# Patient Record
Sex: FEMALE | Race: WHITE | NOT HISPANIC OR LATINO
[De-identification: names, ages, dates, MRNs, and addresses within clinical notes are randomized per-mention and may not be internally consistent; named-entity substitution may affect disease eponyms.]

---

## 2017-03-28 ENCOUNTER — RESULT REVIEW (OUTPATIENT)
Age: 52
End: 2017-03-28

## 2017-04-12 ENCOUNTER — APPOINTMENT (OUTPATIENT)
Dept: ULTRASOUND IMAGING | Facility: CLINIC | Age: 52
End: 2017-04-12

## 2017-04-12 ENCOUNTER — OUTPATIENT (OUTPATIENT)
Dept: OUTPATIENT SERVICES | Facility: HOSPITAL | Age: 52
LOS: 1 days | End: 2017-04-12
Payer: COMMERCIAL

## 2017-04-12 DIAGNOSIS — Z98.89 OTHER SPECIFIED POSTPROCEDURAL STATES: Chronic | ICD-10-CM

## 2017-04-12 DIAGNOSIS — Z00.8 ENCOUNTER FOR OTHER GENERAL EXAMINATION: ICD-10-CM

## 2017-04-12 PROCEDURE — 93880 EXTRACRANIAL BILAT STUDY: CPT

## 2017-07-24 ENCOUNTER — OUTPATIENT (OUTPATIENT)
Dept: OUTPATIENT SERVICES | Facility: HOSPITAL | Age: 52
LOS: 1 days | End: 2017-07-24
Payer: COMMERCIAL

## 2017-07-24 VITALS
OXYGEN SATURATION: 98 % | HEART RATE: 72 BPM | DIASTOLIC BLOOD PRESSURE: 70 MMHG | SYSTOLIC BLOOD PRESSURE: 132 MMHG | HEIGHT: 61 IN | TEMPERATURE: 98 F | RESPIRATION RATE: 18 BRPM | WEIGHT: 125 LBS

## 2017-07-24 DIAGNOSIS — Z98.89 OTHER SPECIFIED POSTPROCEDURAL STATES: Chronic | ICD-10-CM

## 2017-07-24 DIAGNOSIS — Z85.3 PERSONAL HISTORY OF MALIGNANT NEOPLASM OF BREAST: ICD-10-CM

## 2017-07-24 DIAGNOSIS — Z90.13 ACQUIRED ABSENCE OF BILATERAL BREASTS AND NIPPLES: ICD-10-CM

## 2017-07-24 DIAGNOSIS — E78.5 HYPERLIPIDEMIA, UNSPECIFIED: ICD-10-CM

## 2017-07-24 DIAGNOSIS — Z01.818 ENCOUNTER FOR OTHER PREPROCEDURAL EXAMINATION: ICD-10-CM

## 2017-07-24 DIAGNOSIS — Z90.13 ACQUIRED ABSENCE OF BILATERAL BREASTS AND NIPPLES: Chronic | ICD-10-CM

## 2017-07-24 DIAGNOSIS — N65.0 DEFORMITY OF RECONSTRUCTED BREAST: ICD-10-CM

## 2017-07-24 LAB
HCT VFR BLD CALC: 37.6 % — SIGNIFICANT CHANGE UP (ref 34.5–45)
HGB BLD-MCNC: 12.7 G/DL — SIGNIFICANT CHANGE UP (ref 11.5–15.5)
MCHC RBC-ENTMCNC: 29.1 PG — SIGNIFICANT CHANGE UP (ref 27–34)
MCHC RBC-ENTMCNC: 33.8 GM/DL — SIGNIFICANT CHANGE UP (ref 32–36)
MCV RBC AUTO: 86 FL — SIGNIFICANT CHANGE UP (ref 80–100)
PLATELET # BLD AUTO: 336 K/UL — SIGNIFICANT CHANGE UP (ref 150–400)
RBC # BLD: 4.37 M/UL — SIGNIFICANT CHANGE UP (ref 3.8–5.2)
RBC # FLD: 13.1 % — SIGNIFICANT CHANGE UP (ref 10.3–14.5)
WBC # BLD: 5.89 K/UL — SIGNIFICANT CHANGE UP (ref 3.8–10.5)
WBC # FLD AUTO: 5.89 K/UL — SIGNIFICANT CHANGE UP (ref 3.8–10.5)

## 2017-07-24 PROCEDURE — 85027 COMPLETE CBC AUTOMATED: CPT

## 2017-07-24 PROCEDURE — G0463: CPT

## 2017-07-24 RX ORDER — SODIUM CHLORIDE 9 MG/ML
3 INJECTION INTRAMUSCULAR; INTRAVENOUS; SUBCUTANEOUS EVERY 8 HOURS
Qty: 0 | Refills: 0 | Status: DISCONTINUED | OUTPATIENT
Start: 2017-08-07 | End: 2017-08-22

## 2017-07-24 RX ORDER — LIDOCAINE HCL 20 MG/ML
0.2 VIAL (ML) INJECTION ONCE
Qty: 0 | Refills: 0 | Status: DISCONTINUED | OUTPATIENT
Start: 2017-08-07 | End: 2017-08-22

## 2017-07-24 RX ORDER — APREPITANT 80 MG/1
40 CAPSULE ORAL ONCE
Qty: 0 | Refills: 0 | Status: COMPLETED | OUTPATIENT
Start: 2017-08-07 | End: 2017-08-07

## 2017-07-24 NOTE — H&P PST ADULT - PROBLEM SELECTOR PLAN 1
bilateral breast revision reconstruction with alloderm  bilateral intercostal  pectoralis block bilateral breast revision reconstruction with alloderm  bilateral intercostal  pectoralis block  No BP/IV/labs left arm

## 2017-07-24 NOTE — H&P PST ADULT - PSH
S/P abdominoplasty  1995  S/P arthroscopy of shoulder  right 2009  S/P mastectomy, bilateral  10/2014 bilateral mastectomy  with left  axillary node dissection, 2015 breast reconstruction with implants  S/P myomectomy  1988 with tubal ligation  S/P ovarian cystectomy  left 2000

## 2017-07-24 NOTE — H&P PST ADULT - PMH
Anxiety    Breast neoplasm  left had chemo 5/14 - 9/14.  Insomnia    Rotator cuff injury Anxiety    Breast neoplasm  left had chemo 5/14 - 9/14.  Hyperlipidemia    Insomnia    Vitamin D deficiency

## 2017-07-24 NOTE — H&P PST ADULT - HISTORY OF PRESENT ILLNESS
This is a 53 y/o female with history of breast cancer , she presents today for breast revision reconstruction

## 2017-07-24 NOTE — H&P PST ADULT - ATTENDING COMMENTS
Patient with history of bilateral mastectomies and implant reconstruction now for bilateral breast revision reconstruction including placement of silverio volume Inspira implants and fat grafting as well as possible fold reinforcement with AlloDerm. Procedure, incision placement and risks reviewed. Fat donor sites will be lateral chest and hip region. Markings, photo, consent.

## 2017-07-24 NOTE — H&P PST ADULT - ASSESSMENT
deformity of reconstructed breast  acquired absence of bilateral breasts and nipples  personal history of malignant neoplasm of breast

## 2017-07-24 NOTE — H&P PST ADULT - NSANTHOSAYNRD_GEN_A_CORE
No. LEIGHTON screening performed.  STOP BANG Legend: 0-2 = LOW Risk; 3-4 = INTERMEDIATE Risk; 5-8 = HIGH Risk

## 2017-08-07 ENCOUNTER — TRANSCRIPTION ENCOUNTER (OUTPATIENT)
Age: 52
End: 2017-08-07

## 2017-08-07 ENCOUNTER — OUTPATIENT (OUTPATIENT)
Dept: OUTPATIENT SERVICES | Facility: HOSPITAL | Age: 52
LOS: 1 days | End: 2017-08-07
Payer: COMMERCIAL

## 2017-08-07 ENCOUNTER — RESULT REVIEW (OUTPATIENT)
Age: 52
End: 2017-08-07

## 2017-08-07 VITALS
HEART RATE: 79 BPM | WEIGHT: 125 LBS | TEMPERATURE: 99 F | OXYGEN SATURATION: 99 % | RESPIRATION RATE: 18 BRPM | DIASTOLIC BLOOD PRESSURE: 87 MMHG | HEIGHT: 61 IN | SYSTOLIC BLOOD PRESSURE: 142 MMHG

## 2017-08-07 VITALS
RESPIRATION RATE: 15 BRPM | DIASTOLIC BLOOD PRESSURE: 72 MMHG | TEMPERATURE: 98 F | OXYGEN SATURATION: 99 % | HEART RATE: 80 BPM | SYSTOLIC BLOOD PRESSURE: 126 MMHG

## 2017-08-07 DIAGNOSIS — N65.0 DEFORMITY OF RECONSTRUCTED BREAST: ICD-10-CM

## 2017-08-07 DIAGNOSIS — Z98.89 OTHER SPECIFIED POSTPROCEDURAL STATES: Chronic | ICD-10-CM

## 2017-08-07 DIAGNOSIS — Z90.13 ACQUIRED ABSENCE OF BILATERAL BREASTS AND NIPPLES: ICD-10-CM

## 2017-08-07 DIAGNOSIS — Z85.3 PERSONAL HISTORY OF MALIGNANT NEOPLASM OF BREAST: ICD-10-CM

## 2017-08-07 DIAGNOSIS — Z90.13 ACQUIRED ABSENCE OF BILATERAL BREASTS AND NIPPLES: Chronic | ICD-10-CM

## 2017-08-07 PROCEDURE — 19342 INSJ/RPLCMT BRST IMPLT SEP D: CPT | Mod: 50

## 2017-08-07 PROCEDURE — 88304 TISSUE EXAM BY PATHOLOGIST: CPT

## 2017-08-07 PROCEDURE — 88304 TISSUE EXAM BY PATHOLOGIST: CPT | Mod: 26

## 2017-08-07 PROCEDURE — 99261: CPT

## 2017-08-07 PROCEDURE — 15777 ACELLULAR DERM MATRIX IMPLT: CPT

## 2017-08-07 PROCEDURE — C1789: CPT

## 2017-08-07 RX ORDER — ROSUVASTATIN CALCIUM 5 MG/1
1 TABLET ORAL
Qty: 0 | Refills: 0 | COMMUNITY

## 2017-08-07 RX ORDER — CHOLECALCIFEROL (VITAMIN D3) 125 MCG
1 CAPSULE ORAL
Qty: 0 | Refills: 0 | COMMUNITY

## 2017-08-07 RX ORDER — ONDANSETRON 8 MG/1
4 TABLET, FILM COATED ORAL ONCE
Qty: 0 | Refills: 0 | Status: COMPLETED | OUTPATIENT
Start: 2017-08-07 | End: 2017-08-07

## 2017-08-07 RX ORDER — OXYCODONE HYDROCHLORIDE 5 MG/1
5 TABLET ORAL ONCE
Qty: 0 | Refills: 0 | Status: DISCONTINUED | OUTPATIENT
Start: 2017-08-07 | End: 2017-08-07

## 2017-08-07 RX ORDER — ACETAMINOPHEN 500 MG
1000 TABLET ORAL ONCE
Qty: 0 | Refills: 0 | Status: COMPLETED | OUTPATIENT
Start: 2017-08-07 | End: 2017-08-07

## 2017-08-07 RX ORDER — DIPHENHYDRAMINE HCL 50 MG
25 CAPSULE ORAL ONCE
Qty: 0 | Refills: 0 | Status: COMPLETED | OUTPATIENT
Start: 2017-08-07 | End: 2017-08-07

## 2017-08-07 RX ORDER — SODIUM CHLORIDE 9 MG/ML
1000 INJECTION, SOLUTION INTRAVENOUS
Qty: 0 | Refills: 0 | Status: DISCONTINUED | OUTPATIENT
Start: 2017-08-07 | End: 2017-08-07

## 2017-08-07 RX ORDER — SODIUM CHLORIDE 9 MG/ML
1000 INJECTION, SOLUTION INTRAVENOUS
Qty: 0 | Refills: 0 | Status: DISCONTINUED | OUTPATIENT
Start: 2017-08-07 | End: 2017-08-22

## 2017-08-07 RX ORDER — SCOPALAMINE 1 MG/3D
1.5 PATCH, EXTENDED RELEASE TRANSDERMAL ONCE
Qty: 0 | Refills: 0 | Status: DISCONTINUED | OUTPATIENT
Start: 2017-08-07 | End: 2017-08-07

## 2017-08-07 RX ADMIN — APREPITANT 40 MILLIGRAM(S): 80 CAPSULE ORAL at 11:03

## 2017-08-07 RX ADMIN — Medication 1000 MILLIGRAM(S): at 15:40

## 2017-08-07 RX ADMIN — Medication 400 MILLIGRAM(S): at 15:02

## 2017-08-07 RX ADMIN — ONDANSETRON 4 MILLIGRAM(S): 8 TABLET, FILM COATED ORAL at 15:02

## 2017-08-07 RX ADMIN — Medication 25 MILLIGRAM(S): at 15:52

## 2017-08-07 NOTE — BRIEF OPERATIVE NOTE - PROCEDURE
Breast revision surgery  08/07/2017  Bilateral breast revision/reconstruction with placement of Alloderm and capsular repair; Exchange of bilateral breast implants  Active  VIOLETTEL

## 2017-08-07 NOTE — BRIEF OPERATIVE NOTE - PRE-OP DX
Acquired absence of both breasts  08/07/2017    Active  Nixon Brown  Malignant neoplasm of female breast, unspecified estrogen receptor status, unspecified laterality, unspecified site of breast  08/07/2017    Active  Nixon Brown

## 2017-08-07 NOTE — ASU PATIENT PROFILE, ADULT - PMH
Anxiety    Breast neoplasm  left had chemo 5/14 - 9/14.  Hyperlipidemia    Insomnia    Vitamin D deficiency

## 2017-08-19 LAB — SURGICAL PATHOLOGY STUDY: SIGNIFICANT CHANGE UP

## 2018-06-06 ENCOUNTER — RESULT REVIEW (OUTPATIENT)
Age: 53
End: 2018-06-06

## 2020-07-15 ENCOUNTER — RESULT REVIEW (OUTPATIENT)
Age: 55
End: 2020-07-15

## 2021-06-08 PROBLEM — E55.9 VITAMIN D DEFICIENCY, UNSPECIFIED: Chronic | Status: ACTIVE | Noted: 2017-07-24

## 2021-06-08 PROBLEM — E78.5 HYPERLIPIDEMIA, UNSPECIFIED: Chronic | Status: ACTIVE | Noted: 2017-07-24

## 2021-08-05 ENCOUNTER — APPOINTMENT (OUTPATIENT)
Dept: OBGYN | Facility: CLINIC | Age: 56
End: 2021-08-05
Payer: COMMERCIAL

## 2021-08-05 VITALS
HEIGHT: 61 IN | SYSTOLIC BLOOD PRESSURE: 120 MMHG | WEIGHT: 116 LBS | DIASTOLIC BLOOD PRESSURE: 74 MMHG | BODY MASS INDEX: 21.9 KG/M2

## 2021-08-05 DIAGNOSIS — Z13.71 ENCOUNTER FOR NONPROCREATIVE SCREENING FOR GENETIC DISEASE CARRIER STATUS: ICD-10-CM

## 2021-08-05 PROCEDURE — 99396 PREV VISIT EST AGE 40-64: CPT

## 2021-08-06 LAB — HPV HIGH+LOW RISK DNA PNL CVX: NOT DETECTED

## 2021-08-11 LAB — CYTOLOGY CVX/VAG DOC THIN PREP: ABNORMAL

## 2021-08-14 ENCOUNTER — TRANSCRIPTION ENCOUNTER (OUTPATIENT)
Age: 56
End: 2021-08-14

## 2021-09-08 ENCOUNTER — APPOINTMENT (OUTPATIENT)
Dept: SURGERY | Facility: CLINIC | Age: 56
End: 2021-09-08
Payer: COMMERCIAL

## 2021-09-08 PROCEDURE — 99203K: CUSTOM

## 2021-09-23 ENCOUNTER — APPOINTMENT (OUTPATIENT)
Dept: OBGYN | Facility: CLINIC | Age: 56
End: 2021-09-23

## 2021-09-30 ENCOUNTER — APPOINTMENT (OUTPATIENT)
Dept: OBGYN | Facility: CLINIC | Age: 56
End: 2021-09-30
Payer: COMMERCIAL

## 2021-09-30 ENCOUNTER — ASOB RESULT (OUTPATIENT)
Age: 56
End: 2021-09-30

## 2021-09-30 PROCEDURE — 76830 TRANSVAGINAL US NON-OB: CPT

## 2021-10-05 ENCOUNTER — NON-APPOINTMENT (OUTPATIENT)
Age: 56
End: 2021-10-05

## 2022-08-05 ENCOUNTER — NON-APPOINTMENT (OUTPATIENT)
Age: 57
End: 2022-08-05

## 2022-08-09 ENCOUNTER — APPOINTMENT (OUTPATIENT)
Dept: OBGYN | Facility: CLINIC | Age: 57
End: 2022-08-09

## 2022-08-09 VITALS
WEIGHT: 115 LBS | BODY MASS INDEX: 21.71 KG/M2 | HEIGHT: 61 IN | DIASTOLIC BLOOD PRESSURE: 84 MMHG | SYSTOLIC BLOOD PRESSURE: 126 MMHG

## 2022-08-09 DIAGNOSIS — N95.2 POSTMENOPAUSAL ATROPHIC VAGINITIS: ICD-10-CM

## 2022-08-09 PROCEDURE — 99396 PREV VISIT EST AGE 40-64: CPT

## 2022-08-09 PROCEDURE — 82270 OCCULT BLOOD FECES: CPT

## 2022-08-09 NOTE — PLAN
[FreeTextEntry1] : 57 year old female pt presents for Routine Gyn Exam:\par BSA, calcium, vitamin D and exercise d/w pt\par Pap smear conducted w/ HPV\par DXA d/w patient of 9/2021- SHe will follow up at [facility] for this. \par Advised pt to see PCP and dermatologist annually\par Atrophy: she will c/w otc lubricants \par Advised to schedule genetic counseling for an expanded cancer genetics panel.  She will contemplate this\par RTO in 1 year or PRN\par

## 2022-08-09 NOTE — HISTORY OF PRESENT ILLNESS
[Patient reported colonoscopy was normal] : Patient reported colonoscopy was normal [Currently Active] : currently active [Men] : men [TextBox_4] : pelvic sono 9/2021: endometrial thickness 1 mm, normal ovaries [Mammogramdate] : 1/2014 [TextBox_19] : pt had b/l mastectomy  [BreastSonogramDate] : 1/2014 [PapSmeardate] : 8/2021 [TextBox_31] : nml [BoneDensityDate] : 9/2021 [TextBox_37] : osteopenia per patient- done at [facility] [ColonoscopyDate] : 7/2021 [TextBox_43] : q  5 yrs was advised by her GI MD [Yes] : Patient has concerns regarding sex [FreeTextEntry1] : dryness, uses OTC lubricaiton

## 2022-08-09 NOTE — REVIEW OF SYSTEMS
[Patient Intake Form Reviewed] : Patient intake form was reviewed [Negative] : Heme/Lymph [FreeTextEntry8] : [place]

## 2022-08-10 LAB — HPV HIGH+LOW RISK DNA PNL CVX: NOT DETECTED

## 2022-08-13 LAB — CYTOLOGY CVX/VAG DOC THIN PREP: ABNORMAL

## 2023-07-22 ENCOUNTER — NON-APPOINTMENT (OUTPATIENT)
Age: 58
End: 2023-07-22

## 2023-09-14 ENCOUNTER — APPOINTMENT (OUTPATIENT)
Dept: OBGYN | Facility: CLINIC | Age: 58
End: 2023-09-14
Payer: COMMERCIAL

## 2023-09-14 VITALS
HEIGHT: 61 IN | BODY MASS INDEX: 23.6 KG/M2 | SYSTOLIC BLOOD PRESSURE: 138 MMHG | WEIGHT: 125 LBS | DIASTOLIC BLOOD PRESSURE: 85 MMHG

## 2023-09-14 DIAGNOSIS — Z01.419 ENCOUNTER FOR GYNECOLOGICAL EXAMINATION (GENERAL) (ROUTINE) W/OUT ABNORMAL FINDINGS: ICD-10-CM

## 2023-09-14 PROCEDURE — 82270 OCCULT BLOOD FECES: CPT

## 2023-09-14 PROCEDURE — 99396 PREV VISIT EST AGE 40-64: CPT

## 2023-09-18 LAB
CYTOLOGY CVX/VAG DOC THIN PREP: ABNORMAL
HPV HIGH+LOW RISK DNA PNL CVX: NOT DETECTED

## 2023-09-19 ENCOUNTER — ASOB RESULT (OUTPATIENT)
Age: 58
End: 2023-09-19

## 2023-09-19 ENCOUNTER — APPOINTMENT (OUTPATIENT)
Dept: OBGYN | Facility: CLINIC | Age: 58
End: 2023-09-19
Payer: COMMERCIAL

## 2023-09-19 PROCEDURE — 76830 TRANSVAGINAL US NON-OB: CPT

## 2024-09-26 ENCOUNTER — NON-APPOINTMENT (OUTPATIENT)
Age: 59
End: 2024-09-26

## 2024-09-27 ENCOUNTER — APPOINTMENT (OUTPATIENT)
Dept: OBGYN | Facility: CLINIC | Age: 59
End: 2024-09-27
Payer: COMMERCIAL

## 2024-09-27 VITALS
WEIGHT: 127 LBS | BODY MASS INDEX: 23.98 KG/M2 | HEIGHT: 61 IN | SYSTOLIC BLOOD PRESSURE: 132 MMHG | DIASTOLIC BLOOD PRESSURE: 85 MMHG

## 2024-09-27 DIAGNOSIS — Z01.419 ENCOUNTER FOR GYNECOLOGICAL EXAMINATION (GENERAL) (ROUTINE) W/OUT ABNORMAL FINDINGS: ICD-10-CM

## 2024-09-27 PROCEDURE — 82270 OCCULT BLOOD FECES: CPT

## 2024-09-27 PROCEDURE — 99396 PREV VISIT EST AGE 40-64: CPT

## 2024-09-27 PROCEDURE — 99459 PELVIC EXAMINATION: CPT

## 2024-09-30 LAB — HPV HIGH+LOW RISK DNA PNL CVX: NOT DETECTED

## 2024-10-02 NOTE — PHYSICAL EXAM
[Chaperone Present] : A chaperone was present in the examining room during all aspects of the physical examination [[id number]] : A chaperone was present during the pelvic exam. [Appropriately responsive] : appropriately responsive [Alert] : alert [No Acute Distress] : no acute distress [No Lymphadenopathy] : no lymphadenopathy [Regular Rate Rhythm] : regular rate rhythm [No Murmurs] : no murmurs [Clear to Auscultation B/L] : clear to auscultation bilaterally [Soft] : soft [Non-tender] : non-tender [Non-distended] : non-distended [No HSM] : No HSM [No Lesions] : no lesions [No Mass] : no mass [Oriented x3] : oriented x3 [Examination Of The Breasts] : a normal appearance [] : implants [Right Breast Absent] : a total mastectomy [Breast Reconstruction Right] : breast reconstruction [Left Breast Absent] : a total mastectomy [Breast Reconstruction Left] : breast reconstruction [No Masses] : no breast masses were palpable [Vulvar Atrophy] : vulvar atrophy [Labia Majora] : normal [Labia Minora] : normal [Atrophy] : atrophy [Normal] : normal [Uterine Adnexae] : normal [FreeTextEntry2] : [facility] [FreeTextEntry9] : no masses, guaiac negative

## 2024-10-02 NOTE — HISTORY OF PRESENT ILLNESS
[Patient reported PAP Smear was normal] : Patient reported PAP Smear was normal [Patient reported bone density results were normal] : Patient reported bone density results were normal [Patient reported colonoscopy was normal] : Patient reported colonoscopy was normal [FreeTextEntry1] : 2024. DIMPLE BOUCHER 59 year old female  LMP . She presents for an annual gyn exam.    Pt reports of sudden onset of intermittent lower back pain and R hip/abdominal pain that started yesterday. She does live an active lifestyle but denies recent strenuous activity that could be a cause of this discomfort. Reports pain worsens with activity, improved with Aleve/Motrin/heating pad.  She denies pelvic pain, no vaginal bleeding, discharge or vaginitis symptoms. She reports normal urination, no dysuria, no incontinence of urine. BM is normal per patient, no blood in stool or constipation/diarrhea.   No new medications or surgeries.  [place] [facility] [place]   ObHx:  2x 1991 1994 PMHx: breast ca in , BRCA1/2 negative (testing in ), osteopenia, basal cell on R ear Meds: Crestor, Ambien, Linzess GynHx: Hx of oral HSV, ovarian cysts. No Hx of STI, Fibroids, abnl paps, or pelvic infections. SurgHx:  lap BTL and ovarian cystectomy due to torsion,  b/l mastectomy, 2015 implants, Moh's  on right ear-basal cell FamHx: Breast ca: paternal grandmother onset 60s and self, Colon ca: Maternal grandfather Denies FHx of ovarian, uterine or pancreatic cancer. All: sulfa and penicillin Social: denies drugs or smoking, social alcohol. Of note: Pt lives in [place] for more than half the year. Her son and daughter in law are expecting a baby next year. Her daughter is getting  next year. PHQ9 = 2   [TextBox_4] : pelvic sono 09/23 - anteverted uterus, EML 2.28mm, b/l ovaries normal [PapSmeardate] : 09/23 [TextBox_31] : NILM HPV not detected [BoneDensityDate] : 08/23 [TextBox_37] : osteopenia, done @ [facility] [ColonoscopyDate] : 07/21 [TextBox_43] : GI MD advised repeat in 5 yrs

## 2024-10-02 NOTE — PLAN
[FreeTextEntry1] : Routine Gyn Exam: BSA, calcium, vitamin D and exercise d/w pt Pap/HPV Colonoscopy due 2026 DXA d/w patient, due 2025 (pt gets DEXA @ [facility]) Advised pt to see PCP and dermatologist annually  R hip/lower back discomfort: Likely musculoskeletal Advised Tylenol/Motrin as needed Pt to monitor - if persists or worsens, advised to see orthopedist. Has f/u with PCP in 11/24  RTO in 1 year or PRN

## 2024-10-02 NOTE — PHYSICAL EXAM
[Chaperone Present] : A chaperone was present in the examining room during all aspects of the physical examination [[id number]] : A chaperone was present during the pelvic exam. [Appropriately responsive] : appropriately responsive [Alert] : alert [No Acute Distress] : no acute distress [No Lymphadenopathy] : no lymphadenopathy [Regular Rate Rhythm] : regular rate rhythm [No Murmurs] : no murmurs [Clear to Auscultation B/L] : clear to auscultation bilaterally [Soft] : soft [Non-tender] : non-tender [Non-distended] : non-distended [No HSM] : No HSM [No Lesions] : no lesions [No Mass] : no mass [Oriented x3] : oriented x3 [Examination Of The Breasts] : a normal appearance [] : implants [Right Breast Absent] : a total mastectomy [Breast Reconstruction Right] : breast reconstruction [Left Breast Absent] : a total mastectomy [Breast Reconstruction Left] : breast reconstruction [No Masses] : no breast masses were palpable [Vulvar Atrophy] : vulvar atrophy [Labia Majora] : normal [Labia Minora] : normal [Atrophy] : atrophy [Normal] : normal [Uterine Adnexae] : normal [FreeTextEntry2] : [facility] [FreeTextEntry9] : no masses, guaiac negative

## 2024-10-02 NOTE — HISTORY OF PRESENT ILLNESS
[Patient reported PAP Smear was normal] : Patient reported PAP Smear was normal [Patient reported bone density results were normal] : Patient reported bone density results were normal [Patient reported colonoscopy was normal] : Patient reported colonoscopy was normal [FreeTextEntry1] : 2024. DIMPLE BOUCHER 59 year old female  LMP . She presents for an annual gyn exam.    Pt reports of sudden onset of intermittent lower back pain and R hip/abdominal pain that started yesterday. She does live an active lifestyle but denies recent strenuous activity that could be a cause of this discomfort. Reports pain worsens with activity, improved with Aleve/Motrin/heating pad.  She denies pelvic pain, no vaginal bleeding, discharge or vaginitis symptoms. She reports normal urination, no dysuria, no incontinence of urine. BM is normal per patient, no blood in stool or constipation/diarrhea.   No new medications or surgeries.  She is seen yearly at [facility] survivorship program.   ObHx:  2x 1991 1994 PMHx: breast ca in , BRCA1/2 negative (testing in ), osteopenia, basal cell on R ear Meds: Crestor, Ambien, Linzess GynHx: Hx of oral HSV, ovarian cysts. No Hx of STI, Fibroids, abnl paps, or pelvic infections. SurgHx:  lap BTL and ovarian cystectomy due to torsion,  b/l mastectomy, 2015 implants, Moh's  on right ear-basal cell FamHx: Breast ca: paternal grandmother onset 60s and self, Colon ca: Maternal grandfather Denies FHx of ovarian, uterine or pancreatic cancer. All: sulfa and penicillin Social: denies drugs or smoking, social alcohol. Of note: Pt lives in [place] for more than half the year. Her son and daughter in law are expecting a baby next year. Her daughter is getting  next year. PHQ9 = 2   [TextBox_4] : pelvic sono 09/23 - anteverted uterus, EML 2.28mm, b/l ovaries normal [PapSmeardate] : 09/23 [TextBox_31] : NILM HPV not detected [BoneDensityDate] : 08/23 [TextBox_37] : osteopenia, done @ [facility] [ColonoscopyDate] : 07/21 [TextBox_43] : GI MD advised repeat in 5 yrs

## 2024-10-06 LAB — CYTOLOGY CVX/VAG DOC THIN PREP: ABNORMAL

## 2025-02-05 NOTE — PACU DISCHARGE NOTE - NAUSEA/VOMITING:
VM #2 and portal message sent to Pt, if no reply by end of day 2/6/25 may close encounter.   Controlled